# Patient Record
Sex: MALE | Race: WHITE | NOT HISPANIC OR LATINO | Employment: FULL TIME | URBAN - METROPOLITAN AREA
[De-identification: names, ages, dates, MRNs, and addresses within clinical notes are randomized per-mention and may not be internally consistent; named-entity substitution may affect disease eponyms.]

---

## 2023-08-30 ENCOUNTER — ANCILLARY PROCEDURE (OUTPATIENT)
Dept: GENERAL RADIOLOGY | Facility: CLINIC | Age: 38
End: 2023-08-30
Attending: FAMILY MEDICINE
Payer: COMMERCIAL

## 2023-08-30 ENCOUNTER — OFFICE VISIT (OUTPATIENT)
Dept: URGENT CARE | Facility: URGENT CARE | Age: 38
End: 2023-08-30
Payer: OTHER MISCELLANEOUS

## 2023-08-30 VITALS
DIASTOLIC BLOOD PRESSURE: 71 MMHG | OXYGEN SATURATION: 99 % | SYSTOLIC BLOOD PRESSURE: 107 MMHG | HEART RATE: 65 BPM | TEMPERATURE: 97.3 F

## 2023-08-30 DIAGNOSIS — S61.209A OPEN WOUND OF FINGER, INITIAL ENCOUNTER: ICD-10-CM

## 2023-08-30 DIAGNOSIS — S69.92XA INJURY OF FINGER OF LEFT HAND, INITIAL ENCOUNTER: Primary | ICD-10-CM

## 2023-08-30 DIAGNOSIS — S62.633B OPEN DISPLACED FRACTURE OF DISTAL PHALANX OF LEFT MIDDLE FINGER, INITIAL ENCOUNTER: ICD-10-CM

## 2023-08-30 DIAGNOSIS — S69.92XA INJURY OF FINGER OF LEFT HAND, INITIAL ENCOUNTER: ICD-10-CM

## 2023-08-30 PROCEDURE — 99204 OFFICE O/P NEW MOD 45 MIN: CPT | Mod: 25 | Performed by: FAMILY MEDICINE

## 2023-08-30 PROCEDURE — 12001 RPR S/N/AX/GEN/TRNK 2.5CM/<: CPT | Performed by: FAMILY MEDICINE

## 2023-08-30 PROCEDURE — 73140 X-RAY EXAM OF FINGER(S): CPT | Mod: TC | Performed by: RADIOLOGY

## 2023-08-30 RX ORDER — IBUPROFEN 200 MG
800 TABLET ORAL ONCE
Status: COMPLETED | OUTPATIENT
Start: 2023-08-30 | End: 2023-08-30

## 2023-08-30 RX ORDER — IBUPROFEN 800 MG/1
800 TABLET, FILM COATED ORAL EVERY 8 HOURS PRN
Qty: 30 TABLET | Refills: 0 | Status: SHIPPED | OUTPATIENT
Start: 2023-08-30 | End: 2023-11-10

## 2023-08-30 RX ORDER — HYDROCODONE BITARTRATE AND ACETAMINOPHEN 5; 325 MG/1; MG/1
1 TABLET ORAL EVERY 6 HOURS PRN
Qty: 10 TABLET | Refills: 0 | Status: SHIPPED | OUTPATIENT
Start: 2023-08-30 | End: 2023-11-10

## 2023-08-30 RX ADMIN — Medication 800 MG: at 13:53

## 2023-08-30 ASSESSMENT — PAIN SCALES - GENERAL: PAINLEVEL: WORST PAIN (10)

## 2023-08-30 NOTE — PROGRESS NOTES
SUBJECTIVE:     Chief Complaint   Patient presents with    Urgent Care     W/c injury- left middle finger     Mich Venegas is a 37 year old male who presents to the clinic with a laceration on the left middle finger sustained 1 hour(s) ago (11:45am).  This is a work related injury.    Mechanism of injury: finger got caught in metal door.    Associated symptoms: Denies numbness, weakness, or loss of function  Last tetanus booster within 10 years: yes, 2021    Works as /delivery    EXAM:   The patient appears today in alert,no apparent distress distress  VITALS: /71 (BP Location: Right arm, Patient Position: Sitting, Cuff Size: Adult Large)   Pulse 65   Temp 97.3  F (36.3  C) (Tympanic)   SpO2 99%     Size of laceration: 2 centimeters  Characteristics of the laceration: bleeding- moderate, dirty, and irregular, fingernail with subungual hematoma, attached well.  Tendon function intact: yes  Sensation to light touch intact: yes  Pulses intact: yes  Picture included in patient's chart: no    XRAY - 3rd left finger - fracture at distal phalanx      Assessment:     Injury of finger of left hand, initial encounter  Open displaced fracture of distal phalanx of left middle finger, initial encounter  Open wound of finger, initial encounter    PLAN:  PROCEDURE NOTE::  Wound was locally injected with 4 cc's of Lidocaine 2% with epinephrine  Good anesthesia was obtained  Prepped and draped in the usual sterile fashion  Wound cleaned with betadine/saline solution  Wound cleaned with Shur-Clens  Laceration was closed using 4 4-0 sutures interrupted sutures  After care instructions:  Keep wound clean and dry for the next 24-48 hours  Sutures out in 7-10 days  Apply anti-bacterial ointment for 5 -7 days  Discussed the probability of scarring  RX Augmentin given  Ibuprofen 800 mg given in clinic, RX ibuprofen 800 mg  RX norco 5/325 mg #10 given to use for worse pain  Referral to Orthopedics for fracture care    Baca  MD Matteo  August 30, 2023 1:51 PM

## 2023-08-30 NOTE — PATIENT INSTRUCTIONS
Okay to take ibuprofen 200 mg - 4 tablets (800 mg) every 8 hours as needed.  Okay to take tylenol 500 mg - 2 tablets (1000 mg) every 6-8 hours as needed, do not exceed 3000 mg in 24 hours.  Use norco  sparingly for worse pain    Elevate, ice    Take full course of antibiotic - Augmentin due to open fracture    Follow up with orthopedic for fracture care    Suture removal in 10 days

## 2023-09-01 ENCOUNTER — OFFICE VISIT (OUTPATIENT)
Dept: URGENT CARE | Facility: URGENT CARE | Age: 38
End: 2023-09-01
Payer: COMMERCIAL

## 2023-09-01 ENCOUNTER — TELEPHONE (OUTPATIENT)
Dept: SURGERY | Facility: CLINIC | Age: 38
End: 2023-09-01
Payer: COMMERCIAL

## 2023-09-01 VITALS
OXYGEN SATURATION: 100 % | HEART RATE: 66 BPM | TEMPERATURE: 97.3 F | WEIGHT: 251.7 LBS | SYSTOLIC BLOOD PRESSURE: 151 MMHG | DIASTOLIC BLOOD PRESSURE: 95 MMHG

## 2023-09-01 DIAGNOSIS — S62.633D OPEN DISPLACED FRACTURE OF DISTAL PHALANX OF LEFT MIDDLE FINGER WITH ROUTINE HEALING, SUBSEQUENT ENCOUNTER: Primary | ICD-10-CM

## 2023-09-01 PROCEDURE — 99213 OFFICE O/P EST LOW 20 MIN: CPT | Performed by: PHYSICIAN ASSISTANT

## 2023-09-01 ASSESSMENT — ENCOUNTER SYMPTOMS
BACK PAIN: 0
CHEST TIGHTNESS: 0
COLOR CHANGE: 1
FEVER: 0
FATIGUE: 0
NECK STIFFNESS: 0
WOUND: 1
JOINT SWELLING: 0
SHORTNESS OF BREATH: 0
NECK PAIN: 0
RHINORRHEA: 0
RESPIRATORY NEGATIVE: 1
MYALGIAS: 1
COUGH: 0
CARDIOVASCULAR NEGATIVE: 1
SORE THROAT: 0
PALPITATIONS: 0
CHILLS: 0
WHEEZING: 0

## 2023-09-01 NOTE — TELEPHONE ENCOUNTER
Injury occurred 8/30. Patient is scheduled within 7 days. Scheduled appropriately.    China WILKINSON, Specialty RN 9/1/2023 11:15 AM

## 2023-09-01 NOTE — TELEPHONE ENCOUNTER
Hello,  I'm with ortho con.  Pt is scheduled with Dr. Reid for a displaced fracture of his finger.  TE is per protocol for fracture.  Thank you.

## 2023-09-01 NOTE — PROGRESS NOTES
Kehinde Epps is a 37 year old, presenting for the following health issues:  WOUND CARE (Pt was at work and got finger smashed between metal and needed sutures, pt was given Vicodin for pain would like to know if there is anything else he can be rx. Would like finger cleaned and  re-bandaged)    HPI   Concern - finger recheck  Onset: 2days ago  Description:  Sustained a L middle finger injury 2days ago at work.  Was initially seen in urgent care in which this was repaired with sutures and told to have removed in 7-10days.  He was also found to have a distal phalanx fx as well.  Is here to have his dressings changed.  Was given norco and augmentin.  Intensity: moderate  Progression of Symptoms:  same  Accompanying Signs & Symptoms: No radicular pain, numbness, tingling or weakness.  No swelling, redness, drainage or fevers.  UTD on his Td.  Previous history of similar problem: no  Precipitating factors:        Worsened by: pressure, movement  Alleviating factors:        Improved by: rest  Therapies tried and outcome: augmentin, norco with some relief  There is no problem list on file for this patient.    Current Outpatient Medications   Medication    amoxicillin-clavulanate (AUGMENTIN) 875-125 MG tablet    BUPROPION HCL    HYDROcodone-acetaminophen (NORCO) 5-325 MG tablet    ibuprofen (ADVIL/MOTRIN) 800 MG tablet    TUSSIONEX PENNKINETIC ER 8-10 MG/5ML OR LQCR     No current facility-administered medications for this visit.      No Known Allergies    Review of Systems   Constitutional:  Negative for chills, fatigue and fever.   HENT: Negative.  Negative for congestion, ear pain, rhinorrhea and sore throat.    Respiratory: Negative.  Negative for cough, chest tightness, shortness of breath and wheezing.    Cardiovascular: Negative.  Negative for chest pain, palpitations and peripheral edema.   Musculoskeletal:  Positive for myalgias. Negative for back pain, gait problem, joint swelling, neck pain and neck  stiffness.   Skin:  Positive for color change and wound. Negative for pallor and rash.   All other systems reviewed and are negative.           Objective    BP (!) 151/95 (BP Location: Left arm, Patient Position: Sitting, Cuff Size: Adult Large)   Pulse 66   Temp 97.3  F (36.3  C) (Tympanic)   Wt 114.2 kg (251 lb 11.2 oz)   SpO2 100%   There is no height or weight on file to calculate BMI.  Physical Exam  Vitals and nursing note reviewed.   Constitutional:       General: He is not in acute distress.     Appearance: Normal appearance. He is normal weight. He is not ill-appearing.   Musculoskeletal:      Right hand: Normal.      Left hand: Laceration (well healing laceration present over palmar surface of middle finger.  no erythema or drainage), tenderness and bony tenderness present. No swelling or deformity. Normal range of motion. Normal strength. Normal sensation. There is no disruption of two-point discrimination. Normal capillary refill. Normal pulse.   Skin:     General: Skin is warm.      Capillary Refill: Capillary refill takes less than 2 seconds.   Neurological:      Mental Status: He is alert.      Sensory: Sensation is intact.      Motor: Motor function is intact.      Deep Tendon Reflexes: Reflexes are normal and symmetric.   Psychiatric:         Mood and Affect: Mood normal.         Behavior: Behavior normal.         Thought Content: Thought content normal.         Judgment: Judgment normal.          Assessment/Plan:  Open displaced fracture of distal phalanx of left middle finger with routine healing, subsequent encounter:   Dressings removed today and finger was placed in finger splint. No evidence of infection at this time.  He is UTD on his Td.  Take ibuprofen with norco as needed for pain.  Continue with augmentin and keep appointment with ortho next week. RTC in 7-10days for suture removal.  RTC sooner if he develops worsening pain, numbness, redness, drainage or fevers.         Delaney See Gladys  DAVID

## 2023-09-06 ENCOUNTER — OFFICE VISIT (OUTPATIENT)
Dept: ORTHOPEDICS | Facility: CLINIC | Age: 38
End: 2023-09-06
Payer: OTHER MISCELLANEOUS

## 2023-09-06 ENCOUNTER — OFFICE VISIT (OUTPATIENT)
Dept: URGENT CARE | Facility: URGENT CARE | Age: 38
End: 2023-09-06
Payer: OTHER MISCELLANEOUS

## 2023-09-06 VITALS
DIASTOLIC BLOOD PRESSURE: 83 MMHG | BODY MASS INDEX: 34.64 KG/M2 | HEIGHT: 71 IN | HEART RATE: 60 BPM | WEIGHT: 247.4 LBS | SYSTOLIC BLOOD PRESSURE: 136 MMHG

## 2023-09-06 VITALS
RESPIRATION RATE: 18 BRPM | HEART RATE: 64 BPM | OXYGEN SATURATION: 96 % | TEMPERATURE: 98.3 F | WEIGHT: 247 LBS | DIASTOLIC BLOOD PRESSURE: 81 MMHG | SYSTOLIC BLOOD PRESSURE: 148 MMHG | BODY MASS INDEX: 34.45 KG/M2

## 2023-09-06 DIAGNOSIS — S62.633B OPEN DISPLACED FRACTURE OF DISTAL PHALANX OF LEFT MIDDLE FINGER, INITIAL ENCOUNTER: ICD-10-CM

## 2023-09-06 DIAGNOSIS — S61.213D LACERATION OF LEFT MIDDLE FINGER WITHOUT FOREIGN BODY, NAIL DAMAGE STATUS UNSPECIFIED, SUBSEQUENT ENCOUNTER: Primary | ICD-10-CM

## 2023-09-06 PROCEDURE — 99214 OFFICE O/P EST MOD 30 MIN: CPT | Performed by: PREVENTIVE MEDICINE

## 2023-09-06 PROCEDURE — 99243 OFF/OP CNSLTJ NEW/EST LOW 30: CPT | Performed by: ORTHOPAEDIC SURGERY

## 2023-09-06 RX ORDER — ESCITALOPRAM OXALATE 20 MG/1
TABLET ORAL
COMMUNITY
Start: 2022-11-14

## 2023-09-06 RX ORDER — LAMOTRIGINE 100 MG/1
100 TABLET ORAL
COMMUNITY
Start: 2023-01-16

## 2023-09-06 RX ORDER — HYDROXYZINE HYDROCHLORIDE 25 MG/1
TABLET, FILM COATED ORAL
COMMUNITY
Start: 2023-08-04

## 2023-09-06 ASSESSMENT — PAIN SCALES - GENERAL: PAINLEVEL: MILD PAIN (3)

## 2023-09-06 NOTE — LETTER
September 6, 2023      Mich Venegas  [address]   [place] [facility] [address]        To Whom It May Concern:    Mich Venegas was seen in our clinic today. He may return to work with the following restrictions: light duty only with left upper extremity. 5lb max lifting with left hand.     Return to clinic in 4 weeks    Sincerely,        Osito Calvillo PA-C, CAQ-OS  Dept. of Orthopedic Surgery  [facility]

## 2023-09-06 NOTE — LETTER
"    9/6/2023         RE: Mich Venegas  [address]  [place] [address]        Dear Colleague,    Thank you for referring your patient, Mich Venegas, to the [facility]. Please see a copy of my visit note below.    Chief Complaint:   Chief Complaint   Patient presents with     Left Middle Finger - Pain     W/C. Distal tuft fracture. DOI 8/30/23. Patient notes he smashed his finger between two metal propane tanks. Immediate pain, swelling, and bleeding. He was seen and given stiches. He was given a splint but he notes that it was a pain in the butt so he took it off. 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        HPI: Mich Venegas is a 37 year old male , right -hand dominant, who presents for evaluation and management of a left  long (middle) finger injury. He injured his hand 8/30/2023, smashing his finger at work between 2 metal propane tanks. Had immediate pain, swelling, bleeding. He was seen in Urgent Care and had stitches placed and a splint. But the splint bothered him so he took it off.      It has been 7 days since the initial injury.       He reports having mild pain/discomfort around the injury site. He denies numbness or tingling initially but then thinks might be a little at the tip. He denies any other injuries to his upper extremity.     Symptoms: pain, swelling.  Location: left long finger tip.  Pain severity: 7/10  Pain quality: aching and throbbing  Frequency of symptoms: are constant.  Aggravating factors: pressure, touch/bumping.  Relieving factors: \"nothing\".    Previous treatment: splint, medications.    Past medical history:  has a past medical history of NONSPECIFIC MEDICAL HISTORY.   There is no problem list on file for this patient.      Past surgical history:  has no past surgical history on file.     Medications:   Current Outpatient Medications:      amoxicillin-clavulanate (AUGMENTIN) 875-125 MG tablet, Take 1 tablet by mouth 2 times daily " "for 10 days, Disp: 20 tablet, Rfl: 0     BUPROPION HCL, None Entered, Disp: , Rfl:      escitalopram (LEXAPRO) 20 MG tablet, , Disp: , Rfl:      hydrOXYzine (ATARAX) 25 MG tablet, Take 1-2 Tablets (25-50 mg) by mouth every 6 hours if needed for Anxiety., Disp: , Rfl:      ibuprofen (ADVIL/MOTRIN) 800 MG tablet, Take 1 tablet (800 mg) by mouth every 8 hours as needed for moderate pain, Disp: 30 tablet, Rfl: 0     lamoTRIgine (LAMICTAL) 100 MG tablet, Take 100 mg by mouth, Disp: , Rfl:      HYDROcodone-acetaminophen (NORCO) 5-325 MG tablet, Take 1 tablet by mouth every 6 hours as needed for severe pain (Patient not taking: Reported on 9/6/2023), Disp: 10 tablet, Rfl: 0     TUSSIONEX PENNKINETIC ER 8-10 MG/5ML OR LQCR, 1 TEASPOONFUL EVERY 12 HOURS AS NEEDED (Patient not taking: Reported on 9/6/2023), Disp: 100, Rfl: 0      Allergies:   No Known Allergies     Family History: family history includes Cancer in his mother; Cerebrovascular Disease in his father.     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 He has never used smokeless tobacco. He reports current alcohol use. He reports that he does not currently use drugs.    Review of Systems:  ROS: 10 point ROS neg other than the symptoms noted above in the HPI and past medical history.    Physical Exam  GENERAL APPEARANCE: healthy, alert, no distress.   SKIN: no suspicious lesions or rashes  NEURO: Normal strength and tone, mentation intact and speech normal  PSYCH:  mentation appears normal and affect normal. Not anxious.  RESPIRATORY: No increased work of breathing.    /83   Pulse 60   Ht 1.803 m (5' 11\")   Wt 112.2 kg (247 lb 6.4 oz)   BMI 34.51 kg/m       left HAND EXAM:    There is a radial sided laceration with sutures, eschar, tip of the left long finger. No erythema, no drainage.  Nail plate in place  There is subungual hematoma  There is mild swelling of the finger tip.  There is mild tenderness in the finger " tip.  There is mild ecchymosis.  There is no erythema of the surrounding skin.  There is no maceration of the skin.  There is no gross deformity in the area.  Range of motion: decreased , and (any)movements are painful.  Intact sensation median, radial, ulnar nerves of the hand, and intact sensation to light touch radial and ulnar digital nerves to fingers.  Brisk capillary refill to all fingers.   Palpable radial pulse, 2+  Intact epl fpl fdp fds edc wrist flexion/extenion biceps triceps deltoid.    X-rays:  3 views left long (middle) finger from 8/30/2023 were reviewed personally in clinic today. On my review of the Xrays, Comminuted fracture of the distal tuft of the long finger, displaced up to 2 mm. Otherwise negative..    Impression:  38yo RHD male with left long finger crush injury, open tuft fracture left long finger tip.    Plan:  Exam, images reviewed  Technically an open fracture, risk for infection  Will remove sutures today  Bone will take a few months to heal. Surgery not indicated  May or may not lose nail plate, will monitor.  Rest  Elevation  Ice as needed  Splint to protect finger tip - stack splint provided today  Light use of the hand with splint is ok, nothing heavy or strenuous  Workability note  Return to clinic 4 weeks, sooner if needed, repeat xrays left long finger    * All questions were addressed and answered prior to discharge from clinic today. The patient acknowledges an understanding of and agreement with the plan set forth during today's visit. Patient was advised to call our office or MyChart us if any further questions arise upon leaving our office today.    return to clinic as needed.    Manish Reid M.D., M.S.  Dept. of Orthopaedic Surgery  [facility]       Again, thank you for allowing me to participate in the care of your patient.        Sincerely,        Manish Reid MD

## 2023-09-06 NOTE — PROGRESS NOTES
"Chief Complaint:   Chief Complaint   Patient presents with    Left Middle Finger - Pain     W/C. Distal tuft fracture. DOI 8/30/23. Patient notes he smashed his finger between two metal propane tanks. Immediate pain, swelling, and bleeding. He was seen and given stiches. He was given a splint but he notes that it was a pain in the butt so he took it off. 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        HPI: Mich Venegas is a 37 year old male , right -hand dominant, who presents for evaluation and management of a left  long (middle) finger injury. He injured his hand 8/30/2023, smashing his finger at work between 2 metal propane tanks. Had immediate pain, swelling, bleeding. He was seen in Urgent Care and had stitches placed and a splint. But the splint bothered him so he took it off.      It has been 7 days since the initial injury.       He reports having mild pain/discomfort around the injury site. He denies numbness or tingling initially but then thinks might be a little at the tip. He denies any other injuries to his upper extremity.     Symptoms: pain, swelling.  Location: left long finger tip.  Pain severity: 7/10  Pain quality: aching and throbbing  Frequency of symptoms: are constant.  Aggravating factors: pressure, touch/bumping.  Relieving factors: \"nothing\".    Previous treatment: splint, medications.    Past medical history:  has a past medical history of NONSPECIFIC MEDICAL HISTORY.   There is no problem list on file for this patient.      Past surgical history:  has no past surgical history on file.     Medications:   Current Outpatient Medications:     amoxicillin-clavulanate (AUGMENTIN) 875-125 MG tablet, Take 1 tablet by mouth 2 times daily for 10 days, Disp: 20 tablet, Rfl: 0    BUPROPION HCL, None Entered, Disp: , Rfl:     escitalopram (LEXAPRO) 20 MG tablet, , Disp: , Rfl:     hydrOXYzine (ATARAX) 25 MG tablet, Take 1-2 Tablets (25-50 mg) by mouth every 6 hours if needed for " "Anxiety., Disp: , Rfl:     ibuprofen (ADVIL/MOTRIN) 800 MG tablet, Take 1 tablet (800 mg) by mouth every 8 hours as needed for moderate pain, Disp: 30 tablet, Rfl: 0    lamoTRIgine (LAMICTAL) 100 MG tablet, Take 100 mg by mouth, Disp: , Rfl:     HYDROcodone-acetaminophen (NORCO) 5-325 MG tablet, Take 1 tablet by mouth every 6 hours as needed for severe pain (Patient not taking: Reported on 9/6/2023), Disp: 10 tablet, Rfl: 0    TUSSIONEX PENNKINETIC ER 8-10 MG/5ML OR LQCR, 1 TEASPOONFUL EVERY 12 HOURS AS NEEDED (Patient not taking: Reported on 9/6/2023), Disp: 100, Rfl: 0      Allergies:   No Known Allergies     Family History: family history includes Cancer in his mother; Cerebrovascular Disease in his father.     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 He has never used smokeless tobacco. He reports current alcohol use. He reports that he does not currently use drugs.    Review of Systems:  ROS: 10 point ROS neg other than the symptoms noted above in the HPI and past medical history.    Physical Exam  GENERAL APPEARANCE: healthy, alert, no distress.   SKIN: no suspicious lesions or rashes  NEURO: Normal strength and tone, mentation intact and speech normal  PSYCH:  mentation appears normal and affect normal. Not anxious.  RESPIRATORY: No increased work of breathing.    /83   Pulse 60   Ht 1.803 m (5' 11\")   Wt 112.2 kg (247 lb 6.4 oz)   BMI 34.51 kg/m       left HAND EXAM:    There is a radial sided laceration with sutures, eschar, tip of the left long finger. No erythema, no drainage.  Nail plate in place  There is subungual hematoma  There is mild swelling of the finger tip.  There is mild tenderness in the finger tip.  There is mild ecchymosis.  There is no erythema of the surrounding skin.  There is no maceration of the skin.  There is no gross deformity in the area.  Range of motion: decreased , and (any)movements are painful.  Intact sensation median, radial, " ulnar nerves of the hand, and intact sensation to light touch radial and ulnar digital nerves to fingers.  Brisk capillary refill to all fingers.   Palpable radial pulse, 2+  Intact epl fpl fdp fds edc wrist flexion/extenion biceps triceps deltoid.    X-rays:  3 views left long (middle) finger from 8/30/2023 were reviewed personally in clinic today. On my review of the Xrays, Comminuted fracture of the distal tuft of the long finger, displaced up to 2 mm. Otherwise negative..    Impression:  38yo RHD male with left long finger crush injury, open tuft fracture left long finger tip.    Plan:  Exam, images reviewed  Technically an open fracture, risk for infection  Will remove sutures today  Bone will take a few months to heal. Surgery not indicated  May or may not lose nail plate, will monitor.  Rest  Elevation  Ice as needed  Splint to protect finger tip - stack splint provided today  Light use of the hand with splint is ok, nothing heavy or strenuous  Workability note  Return to clinic 4 weeks, sooner if needed, repeat xrays left long finger    * All questions were addressed and answered prior to discharge from clinic today. The patient acknowledges an understanding of and agreement with the plan set forth during today's visit. Patient was advised to call our office or MyChart us if any further questions arise upon leaving our office today.    return to clinic as needed.    Manish Reid M.D., M.S.  Dept. of Orthopaedic Surgery  [facility]

## 2023-09-06 NOTE — PROGRESS NOTES
Assessment & Plan     (S61.213D) Laceration of left middle finger without foreign body, nail damage status unspecified, subsequent encounter  (primary encounter diagnosis)  Patient reassured that wound is healing appropriately.  Advised to keep covered for the next 3-5 days to promote continued healing     31 minutes spent by me on the date of the encounter doing chart review, history and exam, documentation and further activities per the note        No follow-ups on file.    Esvin Romero MD  [facility]    Subjective     Mich Venegas is a 37 year old year old male who presents to clinic today for the following health issues:    Patient presents with:  Urgent Care  Wound Check: Per patient states he had stitches removed about two hours ago from finger but when he got home went to wash his hands and felt water inside wound feels it may have re-opened     This is a 36 yo man who cut his left fifth finger 5 days ago and had it stitched at that point.  Had it stitched and splinted.  Followed up with orthopedics today and had stitches removed.  Now here concerned that the wound might have reopened.    There is no problem list on file for this patient.      Current Outpatient Medications   Medication    amoxicillin-clavulanate (AUGMENTIN) 875-125 MG tablet    BUPROPION HCL    escitalopram (LEXAPRO) 20 MG tablet    hydrOXYzine (ATARAX) 25 MG tablet    ibuprofen (ADVIL/MOTRIN) 800 MG tablet    lamoTRIgine (LAMICTAL) 100 MG tablet    HYDROcodone-acetaminophen (NORCO) 5-325 MG tablet    TUSSIONEX PENNKINETIC ER 8-10 MG/5ML OR LQCR     No current facility-administered medications for this visit.       Past Medical History:   Diagnosis Date    NONSPECIFIC MEDICAL HISTORY     stroke as infant. Cerebral Pasly left side.        Social History   reports that he has never smoked. He has never used smokeless tobacco. He reports current alcohol use. He reports that he does not currently use  drugs.    Family History   Problem Relation Age of Onset    Cerebrovascular Disease Father     Cancer Mother         Uterine CA       Review of Systems  Constitutional, HEENT, cardiovascular, pulmonary, GI, , musculoskeletal, neuro, skin, endocrine and psych systems are negative, except as otherwise noted.      Objective    BP (!) 148/81   Pulse 64   Temp 98.3  F (36.8  C) (Tympanic)   Resp 18   Wt 112 kg (247 lb)   SpO2 96%   BMI 34.45 kg/m    Physical Exam   GENERAL: healthy, alert and no distress  EYES: Eyes grossly normal to inspection, PERRL and conjunctivae and sclerae normal  HENT: ear canals and TM's normal, nose and mouth without ulcers or lesions  NECK: no adenopathy, no asymmetry, masses, or scars and thyroid normal to palpation  RESP: lungs clear to auscultation - no rales, rhonchi or wheezes  CV: regular rate and rhythm, normal S1 S2, no S3 or S4, no murmur, click or rub, no peripheral edema and peripheral pulses strong  ABDOMEN: soft, nontender, no hepatosplenomegaly, no masses and bowel sounds normal  MS: no gross musculoskeletal defects noted, no edema  SKIN: no suspicious lesions or rashes except healing linear laceration on volar aspect of left middle finger.  No redness, warmth, drainage, fluctuance, bleeding.  Granulation noted.  NEURO: Normal strength and tone, mentation intact and speech normal  PSYCH: mentation appears normal, affect normal/bright

## 2023-09-27 ENCOUNTER — TELEPHONE (OUTPATIENT)
Dept: SURGERY | Facility: CLINIC | Age: 38
End: 2023-09-27
Payer: COMMERCIAL

## 2023-09-27 NOTE — TELEPHONE ENCOUNTER
Faxed workability to the number provided. Fax confirmed.  Susannah Anthony Certified Medical Assistant

## 2023-09-27 NOTE — TELEPHONE ENCOUNTER
Patient Returning Call    Reason for call:  [facility] requesting workability note to be faxed over to them from 9/6 visit  Fax: [phone]    Information relayed to patient:  te sent to clinic     Patient has additional questions:  No      Okay to leave a detailed message?: Yes at Other phone number:  [phone] *

## 2023-10-06 ENCOUNTER — VIRTUAL VISIT (OUTPATIENT)
Dept: FAMILY MEDICINE | Facility: CLINIC | Age: 38
End: 2023-10-06
Payer: COMMERCIAL

## 2023-10-06 DIAGNOSIS — S62.633P: Primary | ICD-10-CM

## 2023-10-06 PROCEDURE — 99212 OFFICE O/P EST SF 10 MIN: CPT | Mod: VID

## 2023-10-06 NOTE — PROGRESS NOTES
Mich is a 37 year old who is being evaluated via a billable video visit.      How would you like to obtain your AVS? MyChart  If the video visit is dropped, the invitation should be resent by: Text to cell phone: [phone]  Will anyone else be joining your video visit? No        Assessment & Plan     1. Displaced fracture of distal phalanx of left middle finger, subsequent encounter  According to the ortho note he was to follow up with them for repeat xrays in 4 weeks and only light use of his hand. I encouraged him to follow up with the specialist. It also appears like a workability form was faxed to his work a few weeks ago by ortho. He was not aware of this. I did give him a letter for work outlining these recommendations and with the information to follow up with ortho for work clearance without restrictions.      KI Taylor CNP  [facility]   Mich is a 37 year old, presenting for the following health issues:  Forms (Needs form filled out to return to work from Work comp)      10/6/2023     4:17 PM   Additional Questions   Roomed by Tsering   Accompanied by N/A         10/6/2023     4:17 PM   Patient Reported Additional Medications   Patient reports taking the following new medications N/A       History of Present Illness       Reason for visit:  Work comp smashed broken finger tip    He eats 2-3 servings of fruits and vegetables daily.He consumes 0 sweetened beverage(s) daily.He exercises with enough effort to increase his heart rate 60 or more minutes per day.  He exercises with enough effort to increase his heart rate 5 days per week.   He is taking medications regularly.       Mich fractured his middle finger 8/30 at work between 2 metal propane tanks. Was seen in urgent care with sutures and a splint. He then followed up with orthopedics on 9/6, had suture removal and splinting. His finger is feeling better and now he would like to be cleared to return  to work.    Per Ortho note on 9/6:  Light use of the hand with splint is ok, nothing heavy or strenuous  Workability note  Return to clinic 4 weeks, sooner if needed, repeat xrays left long finger      Review of Systems   Constitutional, HEENT, cardiovascular, pulmonary, gi and gu systems are negative, except as otherwise noted.      Objective         Vitals:  No vitals were obtained today due to virtual visit.    Physical Exam   GENERAL: Healthy, alert and no distress  EYES: Eyes grossly normal to inspection.  No discharge or erythema, or obvious scleral/conjunctival abnormalities.  RESP: No audible wheeze, cough, or visible cyanosis.  No visible retractions or increased work of breathing.    SKIN: Visible skin clear. No significant rash, abnormal pigmentation or lesions.  NEURO: Cranial nerves grossly intact.  Mentation and speech appropriate for age.  PSYCH: Mentation appears normal, affect normal/bright, judgement and insight intact, normal speech and appearance well-groomed.          Video-Visit Details    Type of service:  Video Visit   Video Start Time:  1642  Video End Time: 1652    Originating Location (pt. Location): Home  Distant Location (provider location):  On-site  Platform used for Video Visit: [place] [place]

## 2023-10-06 NOTE — LETTER
October 6, 2023      Mich Venegas  [address]   [place] [address]        To Whom It May Concern:    Mich Venegas was seen in our clinic. He may return to work with the following: Per Orthopedic recommendations 9/6 - Light use of the hand with splint is ok, nothing heavy or strenuous. Per workability form faxed 9/27.       Sincerely,    KI Taylor CNP

## 2023-10-31 ENCOUNTER — TELEPHONE (OUTPATIENT)
Dept: FAMILY MEDICINE | Facility: CLINIC | Age: 38
End: 2023-10-31
Payer: COMMERCIAL

## 2023-10-31 NOTE — TELEPHONE ENCOUNTER
October 31, 2023    [employer] [place] was received via fax for Kerrie Dillon DNP to sign.  Patient label was attached to paperwork and placed in provider's inbox to be signed.    Irina Kamara

## 2023-11-06 ENCOUNTER — TELEPHONE (OUTPATIENT)
Dept: FAMILY MEDICINE | Facility: CLINIC | Age: 38
End: 2023-11-06

## 2023-11-06 NOTE — TELEPHONE ENCOUNTER
November 6, 2023    [employer] for workfit test was received via fax for Kerrie Dillon DNP to sign.  Patient label was attached to paperwork and placed in provider's inbox to be signed.    Amy Villalpando

## 2023-11-07 NOTE — TELEPHONE ENCOUNTER
This should be completed by Dr Manish Reid (Orthopedic surgery) who saw Mich for his injury 09/06/2023 and wanted followup x-rays -- [facility]

## 2023-11-08 NOTE — PROGRESS NOTES
"Chief Complaint:   Chief Complaint   Patient presents with    Left Middle Finger - RECHECK     W/C. Distal tuft fracture. DOI 8/30/23 10 weeks S/P. Patient notes the nail is falling off and he will have some drainage from the nail. Finger feels fine, he just doesn't know what to do about the fingernail. No splint. Sometimes he will keep it covered.         INJURY: left long finger crush injury  DATE of INJURY: 8/30/2023        HPI: Mich Venegas is a 37 year old male , right -hand dominant, who presents for followup evaluation and management of a left  long (middle) finger injury. Returns today doing well. His nail is falling off and will get a little drainage at times. Finger otherwise feels fine. He's not sure what to do with the fingernail. He's not using a splint. He will keep it covered at times.    He was just let go from work, sounds like downsizing the company and he's the newest employee so he was let go. He's going to look for new jobs.    He injured his hand 8/30/2023, smashing his finger at work between 2 metal propane tanks. Had immediate pain, swelling, bleeding. He was seen in Urgent Care and had stitches placed and a splint. But the splint bothered him so he took it off and hasn't been wearing.      It has been 10 weeks since the initial injury. We havent seen him since 1 weeks after injury.       He reports having mild pain/discomfort around the injury site. He denies numbness or tingling initially but then thinks might be a little at the tip. He denies any other injuries to his upper extremity.       Symptoms: pain, swelling.  Location: left long finger tip.  Pain severity: 0/10  Pain quality: aching and throbbing  Frequency of symptoms: are constant.  Aggravating factors: pressure, touch/bumping.  Relieving factors: \"nothing\".    Previous treatment: splint, medications.    Past medical history:  has a past medical history of NONSPECIFIC MEDICAL HISTORY.   There is no problem list on file for " "this patient.      Past surgical history:  has no past surgical history on file.     Medications:   Current Outpatient Medications:     BUPROPION HCL, None Entered, Disp: , Rfl:     escitalopram (LEXAPRO) 20 MG tablet, , Disp: , Rfl:     HYDROcodone-acetaminophen (NORCO) 5-325 MG tablet, Take 1 tablet by mouth every 6 hours as needed for severe pain (Patient not taking: Reported on 9/6/2023), Disp: 10 tablet, Rfl: 0    hydrOXYzine (ATARAX) 25 MG tablet, Take 1-2 Tablets (25-50 mg) by mouth every 6 hours if needed for Anxiety., Disp: , Rfl:     ibuprofen (ADVIL/MOTRIN) 800 MG tablet, Take 1 tablet (800 mg) by mouth every 8 hours as needed for moderate pain (Patient not taking: Reported on 10/6/2023), Disp: 30 tablet, Rfl: 0    lamoTRIgine (LAMICTAL) 100 MG tablet, Take 100 mg by mouth, Disp: , Rfl:     TUSSIONEX PENNKINETIC ER 8-10 MG/5ML OR LQCR, 1 TEASPOONFUL EVERY 12 HOURS AS NEEDED (Patient not taking: Reported on 9/6/2023), Disp: 100, Rfl: 0      Allergies:   No Known Allergies     Family History: family history includes Cancer in his mother; Cerebrovascular Disease in his father.     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 He has never used smokeless tobacco. He reports current alcohol use. He reports that he does not currently use drugs.    Review of Systems:     Denies numbness, tingling, parasthesias.   Denies headaches.   Denies fevers, chills, night sweats   Denies chest pain.   Denies shortness of breath.   Denies any skin problems, abrasions, rashes, irritation.    Physical Exam  GENERAL APPEARANCE: healthy, alert, no distress.   SKIN: no suspicious lesions or rashes  NEURO: Normal strength and tone, mentation intact and speech normal  PSYCH:  mentation appears normal and affect normal. Not anxious.  RESPIRATORY: No increased work of breathing.    Resp 16   Ht 1.803 m (5' 11\")   Wt 112 kg (247 lb)   BMI 34.45 kg/m       left HAND EXAM:    The laceration has fully " healed.   No erythema, no active drainage.  Nail plate in loosely in place, appears a new nail is starting to grow out under the nail plate.  There is mild swelling of the finger tip.  There is mild tenderness in the finger tip.  There is noecchymosis.  There is no erythema of the surrounding skin.  There is no maceration of the skin.  There is no gross deformity in the area.  Range of motion: decreased , and (any)movements are not painful.  Intact sensation median, radial, ulnar nerves of the hand, and intact sensation to light touch radial and ulnar digital nerves to fingers.  Brisk capillary refill to all fingers.   Palpable radial pulse, 2+  Intact epl fpl fdp fds edc wrist flexion/extenion biceps triceps deltoid.    X-rays:  3 views left long (middle) finger from 11/10/2023 were reviewed personally in clinic today. On my review of the Xrays, Comminuted fracture of the distal tuft of the long finger, displaced , with some dorsal angulation (appears to be pressing finger on a pad during xray). Minimal callus formation without bone bridging. Otherwise negative..    Impression:  36yo RHD male with left long finger crush injury, open tuft fracture left long finger tip.    Plan:  Exam, images reviewed, fracture not healed.  Technically an open fracture, risk for infection; will escrib Keflex x10 days as he notes occasional drainage.  Bone will take a few months to heal. Surgery not indicated at this time.  May or may not lose nail plate, will monitor. The new nail will likely push off old plate.  Rest  Elevation  Ice as needed  Splint to protect finger tip - stack splint provided previously. Likely will help immobilize and protect the tip and allow better healing.  Activities per comfort.  Workability note not needed, patient states let go from his job.  Return to clinic 4 weeks, sooner if needed, clinical recheck only. Likely rexray in 8 weeks.    * All questions were addressed and answered prior to discharge from  clinic today. The patient acknowledges an understanding of and agreement with the plan set forth during today's visit. Patient was advised to call our office or MyChart us if any further questions arise upon leaving our office today.      Manish Reid M.D., M.S.  Dept. of Orthopaedic Surgery  [facility]

## 2023-11-10 ENCOUNTER — OFFICE VISIT (OUTPATIENT)
Dept: ORTHOPEDICS | Facility: CLINIC | Age: 38
End: 2023-11-10
Payer: COMMERCIAL

## 2023-11-10 ENCOUNTER — ANCILLARY PROCEDURE (OUTPATIENT)
Dept: GENERAL RADIOLOGY | Facility: CLINIC | Age: 38
End: 2023-11-10
Attending: ORTHOPAEDIC SURGERY
Payer: COMMERCIAL

## 2023-11-10 ENCOUNTER — MEDICAL CORRESPONDENCE (OUTPATIENT)
Dept: HEALTH INFORMATION MANAGEMENT | Facility: CLINIC | Age: 38
End: 2023-11-10

## 2023-11-10 VITALS — WEIGHT: 247 LBS | HEIGHT: 71 IN | BODY MASS INDEX: 34.58 KG/M2 | RESPIRATION RATE: 16 BRPM

## 2023-11-10 DIAGNOSIS — S62.633B OPEN DISPLACED FRACTURE OF DISTAL PHALANX OF LEFT MIDDLE FINGER, INITIAL ENCOUNTER: ICD-10-CM

## 2023-11-10 DIAGNOSIS — S62.633D OPEN DISPLACED FRACTURE OF DISTAL PHALANX OF LEFT MIDDLE FINGER WITH ROUTINE HEALING, SUBSEQUENT ENCOUNTER: Primary | ICD-10-CM

## 2023-11-10 PROCEDURE — 99213 OFFICE O/P EST LOW 20 MIN: CPT | Performed by: ORTHOPAEDIC SURGERY

## 2023-11-10 PROCEDURE — 73140 X-RAY EXAM OF FINGER(S): CPT | Mod: TC | Performed by: RADIOLOGY

## 2023-11-10 RX ORDER — CEPHALEXIN 500 MG/1
500 CAPSULE ORAL 2 TIMES DAILY
Qty: 20 CAPSULE | Refills: 0 | Status: SHIPPED | OUTPATIENT
Start: 2023-11-10 | End: 2023-11-20

## 2023-11-10 ASSESSMENT — PAIN SCALES - GENERAL: PAINLEVEL: NO PAIN (0)

## 2023-11-10 NOTE — LETTER
11/10/2023         RE: Mich Venegas  [address]  [place] [address]        Dear Colleague,    Thank you for referring your patient, Mich Venegas, to the [facility]. Please see a copy of my visit note below.    Chief Complaint:   Chief Complaint   Patient presents with     Left Middle Finger - RECHECK     W/C. Distal tuft fracture. DOI 8/30/23 10 weeks S/P. Patient notes the nail is falling off and he will have some drainage from the nail. Finger feels fine, he just doesn't know what to do about the fingernail. No splint. Sometimes he will keep it covered.         INJURY: left long finger crush injury  DATE of INJURY: 8/30/2023        HPI: Mich Venegas is a 37 year old male , right -hand dominant, who presents for followup evaluation and management of a left  long (middle) finger injury. Returns today doing well. His nail is falling off and will get a little drainage at times. Finger otherwise feels fine. He's not sure what to do with the fingernail. He's not using a splint. He will keep it covered at times.    He was just let go from work, sounds like downsizing the company and he's the newest employee so he was let go. He's going to look for new jobs.    He injured his hand 8/30/2023, smashing his finger at work between 2 metal propane tanks. Had immediate pain, swelling, bleeding. He was seen in Urgent Care and had stitches placed and a splint. But the splint bothered him so he took it off and hasn't been wearing.      It has been 10 weeks since the initial injury. We havent seen him since 1 weeks after injury.       He reports having mild pain/discomfort around the injury site. He denies numbness or tingling initially but then thinks might be a little at the tip. He denies any other injuries to his upper extremity.       Symptoms: pain, swelling.  Location: left long finger tip.  Pain severity: 0/10  Pain quality: aching and throbbing  Frequency of symptoms: are  "constant.  Aggravating factors: pressure, touch/bumping.  Relieving factors: \"nothing\".    Previous treatment: splint, medications.    Past medical history:  has a past medical history of NONSPECIFIC MEDICAL HISTORY.   There is no problem list on file for this patient.      Past surgical history:  has no past surgical history on file.     Medications:   Current Outpatient Medications:      BUPROPION HCL, None Entered, Disp: , Rfl:      escitalopram (LEXAPRO) 20 MG tablet, , Disp: , Rfl:      HYDROcodone-acetaminophen (NORCO) 5-325 MG tablet, Take 1 tablet by mouth every 6 hours as needed for severe pain (Patient not taking: Reported on 9/6/2023), Disp: 10 tablet, Rfl: 0     hydrOXYzine (ATARAX) 25 MG tablet, Take 1-2 Tablets (25-50 mg) by mouth every 6 hours if needed for Anxiety., Disp: , Rfl:      ibuprofen (ADVIL/MOTRIN) 800 MG tablet, Take 1 tablet (800 mg) by mouth every 8 hours as needed for moderate pain (Patient not taking: Reported on 10/6/2023), Disp: 30 tablet, Rfl: 0     lamoTRIgine (LAMICTAL) 100 MG tablet, Take 100 mg by mouth, Disp: , Rfl:      TUSSIONEX PENNKINETIC ER 8-10 MG/5ML OR LQCR, 1 TEASPOONFUL EVERY 12 HOURS AS NEEDED (Patient not taking: Reported on 9/6/2023), Disp: 100, Rfl: 0      Allergies:   No Known Allergies     Family History: family history includes Cancer in his mother; Cerebrovascular Disease in his father.     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 He has never used smokeless tobacco. He reports current alcohol use. He reports that he does not currently use drugs.    Review of Systems:     Denies numbness, tingling, parasthesias.   Denies headaches.   Denies fevers, chills, night sweats   Denies chest pain.   Denies shortness of breath.   Denies any skin problems, abrasions, rashes, irritation.    Physical Exam  GENERAL APPEARANCE: healthy, alert, no distress.   SKIN: no suspicious lesions or rashes  NEURO: Normal strength and tone, mentation " "intact and speech normal  PSYCH:  mentation appears normal and affect normal. Not anxious.  RESPIRATORY: No increased work of breathing.    Resp 16   Ht 1.803 m (5' 11\")   Wt 112 kg (247 lb)   BMI 34.45 kg/m       left HAND EXAM:    The laceration has fully healed.   No erythema, no active drainage.  Nail plate in loosely in place, appears a new nail is starting to grow out under the nail plate.  There is mild swelling of the finger tip.  There is mild tenderness in the finger tip.  There is noecchymosis.  There is no erythema of the surrounding skin.  There is no maceration of the skin.  There is no gross deformity in the area.  Range of motion: decreased , and (any)movements are not painful.  Intact sensation median, radial, ulnar nerves of the hand, and intact sensation to light touch radial and ulnar digital nerves to fingers.  Brisk capillary refill to all fingers.   Palpable radial pulse, 2+  Intact epl fpl fdp fds edc wrist flexion/extenion biceps triceps deltoid.    X-rays:  3 views left long (middle) finger from 11/10/2023 were reviewed personally in clinic today. On my review of the Xrays, Comminuted fracture of the distal tuft of the long finger, displaced , with some dorsal angulation (appears to be pressing finger on a pad during xray). Minimal callus formation without bone bridging. Otherwise negative..    Impression:  38yo RHD male with left long finger crush injury, open tuft fracture left long finger tip.    Plan:  Exam, images reviewed, fracture not healed.  Technically an open fracture, risk for infection; will escrib Keflex x10 days as he notes occasional drainage.  Bone will take a few months to heal. Surgery not indicated at this time.  May or may not lose nail plate, will monitor. The new nail will likely push off old plate.  Rest  Elevation  Ice as needed  Splint to protect finger tip - stack splint provided previously. Likely will help immobilize and protect the tip and allow better " healing.  Activities per comfort.  Workability note not needed, patient states let go from his job.  Return to clinic 4 weeks, sooner if needed, clinical recheck only. Likely rexray in 8 weeks.    * All questions were addressed and answered prior to discharge from clinic today. The patient acknowledges an understanding of and agreement with the plan set forth during today's visit. Patient was advised to call our office or MyChart us if any further questions arise upon leaving our office today.      Manish Reid M.D., M.S.  Dept. of Orthopaedic Surgery  [facility]     Faxed a signed Work Fit Test form to [facility] at [phone]. Fax confirmed. Also, set a copy to patient as it indicated to do so. Sent to abstracting.  Susannah Anthony Certified Medical Assistant       Again, thank you for allowing me to participate in the care of your patient.        Sincerely,        Manish Reid MD

## 2023-11-10 NOTE — PROGRESS NOTES
Faxed a signed Work Fit Test form to [employer] at [phone]. Fax confirmed. Also, set a copy to patient as it indicated to do so. Sent to abstracting.  Susannah Anthony Certified Medical Assistant

## 2023-11-25 ENCOUNTER — HEALTH MAINTENANCE LETTER (OUTPATIENT)
Age: 38
End: 2023-11-25

## 2024-04-11 DIAGNOSIS — S62.633B OPEN DISPLACED FRACTURE OF DISTAL PHALANX OF LEFT MIDDLE FINGER, INITIAL ENCOUNTER: ICD-10-CM

## 2025-01-04 ENCOUNTER — HEALTH MAINTENANCE LETTER (OUTPATIENT)
Age: 40
End: 2025-01-04

## 2025-06-17 ENCOUNTER — APPOINTMENT (OUTPATIENT)
Dept: OCCUPATIONAL MEDICINE | Facility: CLINIC | Age: 40
End: 2025-06-17

## 2025-06-17 PROCEDURE — 99000 SPECIMEN HANDLING OFFICE-LAB: CPT | Performed by: NURSE PRACTITIONER
